# Patient Record
Sex: MALE | Race: WHITE | NOT HISPANIC OR LATINO
[De-identification: names, ages, dates, MRNs, and addresses within clinical notes are randomized per-mention and may not be internally consistent; named-entity substitution may affect disease eponyms.]

---

## 2017-03-04 ENCOUNTER — TRANSCRIPTION ENCOUNTER (OUTPATIENT)
Age: 72
End: 2017-03-04

## 2017-04-27 ENCOUNTER — TRANSCRIPTION ENCOUNTER (OUTPATIENT)
Age: 72
End: 2017-04-27

## 2018-05-17 ENCOUNTER — TRANSCRIPTION ENCOUNTER (OUTPATIENT)
Age: 73
End: 2018-05-17

## 2019-02-25 PROBLEM — Z00.00 ENCOUNTER FOR PREVENTIVE HEALTH EXAMINATION: Status: ACTIVE | Noted: 2019-02-25

## 2019-03-27 ENCOUNTER — APPOINTMENT (OUTPATIENT)
Dept: OTOLARYNGOLOGY | Facility: AMBULATORY SURGERY CENTER | Age: 74
End: 2019-03-27

## 2019-04-03 ENCOUNTER — APPOINTMENT (OUTPATIENT)
Dept: OTOLARYNGOLOGY | Facility: AMBULATORY SURGERY CENTER | Age: 74
End: 2019-04-03

## 2019-04-03 ENCOUNTER — OUTPATIENT (OUTPATIENT)
Dept: OUTPATIENT SERVICES | Facility: HOSPITAL | Age: 74
LOS: 1 days | Discharge: HOME | End: 2019-04-03
Payer: MEDICARE

## 2019-04-03 ENCOUNTER — RESULT REVIEW (OUTPATIENT)
Age: 74
End: 2019-04-03

## 2019-04-03 PROCEDURE — 31253 NSL/SINS NDSC TOTAL: CPT | Mod: LT

## 2019-04-03 PROCEDURE — 31267 ENDOSCOPY MAXILLARY SINUS: CPT | Mod: LT

## 2019-04-03 PROCEDURE — 61782 SCAN PROC CRANIAL EXTRA: CPT

## 2019-04-08 ENCOUNTER — APPOINTMENT (OUTPATIENT)
Dept: OTOLARYNGOLOGY | Facility: CLINIC | Age: 74
End: 2019-04-08
Payer: MEDICARE

## 2019-04-08 DIAGNOSIS — Z86.79 PERSONAL HISTORY OF OTHER DISEASES OF THE CIRCULATORY SYSTEM: ICD-10-CM

## 2019-04-08 PROCEDURE — 31237 NSL/SINS NDSC SURG BX POLYPC: CPT | Mod: 50

## 2019-04-08 RX ORDER — IRBESARTAN 300 MG/1
TABLET, FILM COATED ORAL
Refills: 0 | Status: ACTIVE | COMMUNITY

## 2019-04-08 RX ORDER — DONEPEZIL HYDROCHLORIDE 23 MG/1
TABLET, FILM COATED ORAL
Refills: 0 | Status: ACTIVE | COMMUNITY

## 2019-04-08 RX ORDER — CLONAZEPAM 2 MG/1
TABLET ORAL
Refills: 0 | Status: ACTIVE | COMMUNITY

## 2019-04-08 RX ORDER — BUSPIRONE HYDROCHLORIDE 7.5 MG/1
TABLET ORAL
Refills: 0 | Status: ACTIVE | COMMUNITY

## 2019-04-08 RX ORDER — EZETIMIBE 10 MG/1
TABLET ORAL
Refills: 0 | Status: ACTIVE | COMMUNITY

## 2019-04-08 NOTE — ASSESSMENT
[FreeTextEntry1] : Clinically quite stable. He is healing well. He will use twice daily hypertonic saline irrigation. He will go back using CPAP. He will follow up with me in 10 days time was made to

## 2019-04-08 NOTE — CONSULT LETTER
[Dear  ___] : Dear  [unfilled], [Courtesy Letter:] : I had the pleasure of seeing your patient, [unfilled], in my office today. [Please see my note below.] : Please see my note below. [Sincerely,] : Sincerely, [FreeTextEntry3] : Toi Arellano MD

## 2019-04-08 NOTE — HISTORY OF PRESENT ILLNESS
[de-identified] : This gentleman is now 5 days status post left-sided endoscopic sinus surgery. At time of surgery was found to have an abscess in his left maxillary sinus.\par His postoperative care is been unremarkable. He reports that he did not have any pain.\par He reports minimal bleeding for less than 24 hours.\par He does not complain of CSF leak.\par \par He is taking postoperative antibiotic and oral steroid therapy. His culture from the operating room is still pending.

## 2019-04-18 ENCOUNTER — APPOINTMENT (OUTPATIENT)
Dept: OTOLARYNGOLOGY | Facility: CLINIC | Age: 74
End: 2019-04-18
Payer: MEDICARE

## 2019-04-18 PROCEDURE — 31237 NSL/SINS NDSC SURG BX POLYPC: CPT | Mod: 50,58

## 2019-04-18 RX ORDER — ACETAMINOPHEN AND CODEINE 300; 30 MG/1; MG/1
300-30 TABLET ORAL 3 TIMES DAILY
Qty: 10 | Refills: 0 | Status: DISCONTINUED | COMMUNITY
Start: 2019-04-02 | End: 2019-04-18

## 2019-04-18 RX ORDER — AMOXICILLIN AND CLAVULANATE POTASSIUM 875; 125 MG/1; MG/1
875-125 TABLET, COATED ORAL
Qty: 20 | Refills: 0 | Status: DISCONTINUED | COMMUNITY
Start: 2019-04-02 | End: 2019-04-18

## 2019-05-24 ENCOUNTER — APPOINTMENT (OUTPATIENT)
Dept: OTOLARYNGOLOGY | Facility: CLINIC | Age: 74
End: 2019-05-24
Payer: MEDICARE

## 2019-05-24 ENCOUNTER — TRANSCRIPTION ENCOUNTER (OUTPATIENT)
Age: 74
End: 2019-05-24

## 2019-05-24 DIAGNOSIS — J01.41 ACUTE RECURRENT PANSINUSITIS: ICD-10-CM

## 2019-05-24 DIAGNOSIS — J32.0 CHRONIC MAXILLARY SINUSITIS: ICD-10-CM

## 2019-05-24 PROCEDURE — 99213 OFFICE O/P EST LOW 20 MIN: CPT | Mod: 25

## 2019-05-24 PROCEDURE — 31231 NASAL ENDOSCOPY DX: CPT

## 2019-05-24 NOTE — HISTORY OF PRESENT ILLNESS
[de-identified] : Is now approximately 2 months status post left-sided image guided endoscopic sinus surgery.\par The etiology of his pathology was an odontogenic source.\par He has been doing very well up until 4 days ago.\par He reports that he developed some rhinitis with associated mucopus.\par He then noticed some malodorous taste.\par \par He did not have any left facial pain.\par He began back on hypertonic saline irrigations and overall feels much improved.

## 2019-05-24 NOTE — ASSESSMENT
[FreeTextEntry1] : At this point he feels better using hypertonic saline irrigations. I will await the culture result for deciding if I'm going to instituting  antimicrobial therapy.\par He will continue with twice a hypertonic saline irrigations.\par \par I am pleased that his maxillary sinus looked healthy so he will not require at this time additional dental intervention.\par He will follow up with me in 2 weeks time as needed.

## 2019-05-24 NOTE — PROCEDURE
[FreeTextEntry6] : PROCEDURE NOTES\par \par PROCEDURE: Nasal endoscopy \par SURGEON: Dr. Arellano\par INDICATIONS: Assess for chronic sinusitis. \par ANESTHESIA: The patient was placed in a sitting position.  Following application of the topical anesthetic and decongestant, exam was performed with a zero degree endoscope.  The scope was passed along the right nasal floor to the nasopharynx.  It was then passed into the region of the middle meatus, middle turbinate, and sphenoethmoid region.  An identical procedure was performed on the left side.  The following findings were noted:\par \par The nasal mucosa was healthy appearing and the septum was roughly midline. The middle meatus and sphenoethmoid recesses were clear bilaterally. The nasopharynx was normal. \par  \par His outflow tracts and the left or widely patent. His maxillary sinus looked healthy without stenosis or infection. He does have ethmoid edema within ethmoiditis. A culture was taken.

## 2019-05-30 LAB — BACTERIA FLD CULT: ABNORMAL

## 2019-06-07 ENCOUNTER — APPOINTMENT (OUTPATIENT)
Dept: OTOLARYNGOLOGY | Facility: CLINIC | Age: 74
End: 2019-06-07
Payer: MEDICARE

## 2019-06-07 DIAGNOSIS — J32.0 CHRONIC MAXILLARY SINUSITIS: ICD-10-CM

## 2019-06-07 PROCEDURE — 31231 NASAL ENDOSCOPY DX: CPT

## 2019-06-07 PROCEDURE — 99213 OFFICE O/P EST LOW 20 MIN: CPT | Mod: 25

## 2019-06-07 RX ORDER — METHYLPREDNISOLONE 4 MG/1
4 TABLET ORAL
Qty: 40 | Refills: 0 | Status: DISCONTINUED | COMMUNITY
Start: 2019-04-02 | End: 2019-06-07

## 2019-06-07 RX ORDER — CIPROFLOXACIN HYDROCHLORIDE 500 MG/1
500 TABLET, FILM COATED ORAL TWICE DAILY
Qty: 20 | Refills: 0 | Status: COMPLETED | COMMUNITY
Start: 2019-05-30 | End: 2019-06-07

## 2019-06-07 NOTE — PROCEDURE
[FreeTextEntry6] : PROCEDURE NOTES\par \par PROCEDURE: Nasal endoscopy \par SURGEON: Dr. Arellano\par INDICATIONS: Assess for chronic sinusitis. \par ANESTHESIA: The patient was placed in a sitting position.  Following application of the topical anesthetic and decongestant, exam was performed with a zero degree endoscope.  The scope was passed along the right nasal floor to the nasopharynx.  It was then passed into the region of the middle meatus, middle turbinate, and sphenoethmoid region.  An identical procedure was performed on the left side.  The following findings were noted:\par \par The nasal mucosa was healthy appearing and the septum was roughly midline. The middle meatus and sphenoethmoid recesses were clear bilaterally. The nasopharynx was normal. \par

## 2019-06-07 NOTE — ASSESSMENT
[FreeTextEntry1] : Clinically he looks great. The infection is resolved. All sinus ostia were patent. There is no evidence of inflammation or pus.\par \par I have encouraged him to use one to twice daily hypertonic saline irrigations. He will follow up with me on an as-needed basis at this point.

## 2019-06-07 NOTE — HISTORY OF PRESENT ILLNESS
[de-identified] : This gentleman is well-known to me.\par He is now several weeks status post left-sided image guided sinus surgery.\par He had developed a culture positive infection, several weeks after surgery.\par I treated in one week with Cipro.\par He is using twice daily hypertonic saline irrigations.\par \par He reports that he has no complaints. He does not take any mucopus. Is completely asymptomatic

## 2019-06-07 NOTE — CONSULT LETTER
[Dear  ___] : Dear  [unfilled], [Please see my note below.] : Please see my note below. [Courtesy Letter:] : I had the pleasure of seeing your patient, [unfilled], in my office today. [Sincerely,] : Sincerely, [FreeTextEntry3] : Toi Arellano MD [FreeTextEntry1] : Enclosed culture report and operative report.

## 2020-10-02 ENCOUNTER — APPOINTMENT (OUTPATIENT)
Dept: OTOLARYNGOLOGY | Facility: CLINIC | Age: 75
End: 2020-10-02
Payer: MEDICARE

## 2020-10-02 VITALS — BODY MASS INDEX: 28.14 KG/M2 | TEMPERATURE: 95.3 F | HEIGHT: 69 IN | WEIGHT: 190 LBS

## 2020-10-02 DIAGNOSIS — H90.3 SENSORINEURAL HEARING LOSS, BILATERAL: ICD-10-CM

## 2020-10-02 DIAGNOSIS — H61.20 IMPACTED CERUMEN, UNSPECIFIED EAR: ICD-10-CM

## 2020-10-02 PROCEDURE — 92567 TYMPANOMETRY: CPT

## 2020-10-02 PROCEDURE — 92557 COMPREHENSIVE HEARING TEST: CPT

## 2020-10-02 PROCEDURE — 99213 OFFICE O/P EST LOW 20 MIN: CPT | Mod: 25

## 2020-10-02 PROCEDURE — 69210 REMOVE IMPACTED EAR WAX UNI: CPT

## 2020-10-05 NOTE — HISTORY OF PRESENT ILLNESS
[de-identified] : He is concerned that he has decreased hearing bilaterally worse on the right than the left. He believes this may be related to cerumen.\par He is not complain of tinnitus.

## 2020-10-05 NOTE — ASSESSMENT
[FreeTextEntry1] : -Findings were reviewed and discussed with the patient in detail\par -Good aural hygiene reviewed\par -Patient may use wax removal drops as needed\par \par -Avoid noise exposure\par -Audiogram- Slight age-appropriate change.Symmetric high-frequency loss. He reports that this was noticed sometime in the past. He felt traumatically improved after the cerumen was removed.\par -The patient was asked to call and return urgently if any problems\par -I will see the patient in follow up prn.

## 2021-01-09 ENCOUNTER — TRANSCRIPTION ENCOUNTER (OUTPATIENT)
Age: 76
End: 2021-01-09

## 2021-02-06 ENCOUNTER — TRANSCRIPTION ENCOUNTER (OUTPATIENT)
Age: 76
End: 2021-02-06

## 2023-11-03 ENCOUNTER — APPOINTMENT (OUTPATIENT)
Dept: OTOLARYNGOLOGY | Facility: CLINIC | Age: 78
End: 2023-11-03
Payer: MEDICARE

## 2023-11-03 DIAGNOSIS — H61.23 IMPACTED CERUMEN, BILATERAL: ICD-10-CM

## 2023-11-03 DIAGNOSIS — H93.293 OTHER ABNORMAL AUDITORY PERCEPTIONS, BILATERAL: ICD-10-CM

## 2023-11-03 PROCEDURE — 69210 REMOVE IMPACTED EAR WAX UNI: CPT

## 2023-11-03 PROCEDURE — 99203 OFFICE O/P NEW LOW 30 MIN: CPT | Mod: 25

## 2024-12-09 ENCOUNTER — NON-APPOINTMENT (OUTPATIENT)
Age: 79
End: 2024-12-09

## 2024-12-11 ENCOUNTER — NON-APPOINTMENT (OUTPATIENT)
Age: 79
End: 2024-12-11